# Patient Record
Sex: FEMALE | Race: WHITE | NOT HISPANIC OR LATINO | URBAN - METROPOLITAN AREA
[De-identification: names, ages, dates, MRNs, and addresses within clinical notes are randomized per-mention and may not be internally consistent; named-entity substitution may affect disease eponyms.]

---

## 2022-07-07 ENCOUNTER — TELEPHONE ENCOUNTER (OUTPATIENT)
Dept: URBAN - METROPOLITAN AREA CLINIC 78 | Facility: CLINIC | Age: 57
End: 2022-07-07

## 2022-07-07 ENCOUNTER — OFFICE VISIT (OUTPATIENT)
Dept: URBAN - METROPOLITAN AREA CLINIC 78 | Facility: CLINIC | Age: 57
End: 2022-07-07
Payer: COMMERCIAL

## 2022-07-07 ENCOUNTER — DASHBOARD ENCOUNTERS (OUTPATIENT)
Age: 57
End: 2022-07-07

## 2022-07-07 ENCOUNTER — WEB ENCOUNTER (OUTPATIENT)
Dept: URBAN - METROPOLITAN AREA CLINIC 78 | Facility: CLINIC | Age: 57
End: 2022-07-07

## 2022-07-07 VITALS
HEIGHT: 67 IN | TEMPERATURE: 97.8 F | HEART RATE: 70 BPM | BODY MASS INDEX: 22.57 KG/M2 | DIASTOLIC BLOOD PRESSURE: 68 MMHG | SYSTOLIC BLOOD PRESSURE: 104 MMHG | WEIGHT: 143.8 LBS

## 2022-07-07 DIAGNOSIS — E73.9 LACTOSE INTOLERANCE: ICD-10-CM

## 2022-07-07 DIAGNOSIS — H91.90 HEARING LOSS, UNSPECIFIED HEARING LOSS TYPE, UNSPECIFIED LATERALITY: ICD-10-CM

## 2022-07-07 DIAGNOSIS — K62.5 RECTAL BLEEDING: ICD-10-CM

## 2022-07-07 DIAGNOSIS — D50.8 ACQUIRED IRON DEFICIENCY ANEMIA DUE TO DECREASED ABSORPTION: ICD-10-CM

## 2022-07-07 DIAGNOSIS — K62.89 ANAL PAIN: ICD-10-CM

## 2022-07-07 DIAGNOSIS — K59.01 CONSTIPATION: ICD-10-CM

## 2022-07-07 DIAGNOSIS — R10.13 DYSPEPSIA: ICD-10-CM

## 2022-07-07 PROBLEM — 15188001: Status: ACTIVE | Noted: 2022-07-07

## 2022-07-07 PROBLEM — 14760008 CONSTIPATION: Status: ACTIVE | Noted: 2022-07-07

## 2022-07-07 PROBLEM — 267425008 LACTOSE INTOLERANCE: Status: ACTIVE | Noted: 2022-07-07

## 2022-07-07 PROCEDURE — 46600 DIAGNOSTIC ANOSCOPY SPX: CPT | Performed by: INTERNAL MEDICINE

## 2022-07-07 PROCEDURE — 99244 OFF/OP CNSLTJ NEW/EST MOD 40: CPT | Performed by: INTERNAL MEDICINE

## 2022-07-07 PROCEDURE — 99204 OFFICE O/P NEW MOD 45 MIN: CPT | Performed by: INTERNAL MEDICINE

## 2022-07-07 RX ORDER — THYROID, PORCINE 30 MG/1
TAKE 1 TABLET BY MOUTH EVERY DAY IN THE MORNING TABLET ORAL
Qty: 30 EACH | Refills: 3 | Status: ACTIVE | COMMUNITY

## 2022-07-07 RX ORDER — LORATADINE 10 MG/1
1 TABLET TABLET ORAL ONCE A DAY
Status: ACTIVE | COMMUNITY

## 2022-07-07 RX ORDER — SODIUM, POTASSIUM,MAG SULFATES 17.5-3.13G
354 ML SOLUTION, RECONSTITUTED, ORAL ORAL
Qty: 1 | Refills: 0 | OUTPATIENT
Start: 2022-07-07 | End: 2022-07-08

## 2022-07-07 RX ORDER — MONTELUKAST SODIUM 10 MG/1
TAKE 1 TABLET BY MOUTH EVERY DAY TABLET, FILM COATED ORAL
Qty: 30 EACH | Refills: 1 | Status: ACTIVE | COMMUNITY

## 2022-07-07 RX ORDER — HYDROCORTISONE 25 MG/G
USE TWICE A DAY AS NEEDED FOR RECTAL/HEMORRHOID PAIN CREAM TOPICAL
Qty: 30 GRAM | Refills: 0 | Status: ACTIVE | COMMUNITY

## 2022-07-07 NOTE — PHYSICAL EXAM GASTROINTESTINAL
Abdomen , soft, nontender, nondistended , no guarding or rigidity , no masses palpable , normal bowel sounds , Liver and Spleen , no hepatomegaly present , no hepatosplenomegaly , liver nontender , spleen not palpable , Rectal , normal sphincter tone , grade I internal hemorrhoids, no rectal masses or bleeding present, no external hemorrhoids or perianal fluctuance. No anal fissure noted on anoscopy.

## 2022-07-07 NOTE — HPI-TODAY'S VISIT:
The patient was referred to us by Kristen Presley for evaluation of rectal bleeding and anla pain. A copy of this note will be sent to the referring physician.   She has had issues with anal pain and rectal bleeding only noted in the TP which she presumes is from hemorrhoids. She has soiling often.  She uses fiber, Mg (occasionally) and probiotics. Although she has BM's daily, she has a sense of incomplete evacuation.   She had COVID in Jan/Feb2022 despite being fully vaccinated. She was on 2 different  antibiotics and since then she has had ongoing dyspepsia.  She has multiple digestion issues and has been on a gluten and lactose free diet for years.  The patient denies abdominal pain per se but admits to to a sense of lower abdominal discomfort, constipation, diarrhea, bloating, anorexia or unintentional weight loss. She has gained 20lbs lately.   Regarding any upper GI complaints, the patient has not had heartburn, nausea, vomiting or dysphagia.   She has been rejected from donating blood because of anemia. She has not been on Fe replacement because it worsens her constipation.  She states she has been tested for celiac sprue in the past and although it was negative, she felt dizzy and "a feeling of vertigo" whenever she consumed gluten.  She apparently also went to a nutritionist who performed extensive testing and showed her to have multiple food allergies (tuna, black walnuts, eggs, dairy, etc).   The patient does not take blood thinners. She has an irregular heart beat. She has seen a cardiologist but a conservative approach has been recommended.   They deny any CP or BLAIR.  She is [place].  She has asthma which is well controlled.   The patient has never had a colonoscopy previously. There is no FH of colon cancer or colon polyps. Her father had diverticulosis.  She works in senior living.

## 2022-07-11 ENCOUNTER — OFFICE VISIT (OUTPATIENT)
Dept: URBAN - METROPOLITAN AREA CLINIC 23 | Facility: CLINIC | Age: 57
End: 2022-07-11

## 2022-07-13 PROBLEM — 87522002 IRON DEFICIENCY ANEMIA: Status: ACTIVE | Noted: 2022-07-07

## 2022-08-16 ENCOUNTER — TELEPHONE ENCOUNTER (OUTPATIENT)
Dept: URBAN - METROPOLITAN AREA CLINIC 78 | Facility: CLINIC | Age: 57
End: 2022-08-16

## 2022-08-26 ENCOUNTER — TELEPHONE ENCOUNTER (OUTPATIENT)
Dept: URBAN - METROPOLITAN AREA CLINIC 78 | Facility: CLINIC | Age: 57
End: 2022-08-26

## 2022-08-26 ENCOUNTER — OFFICE VISIT (OUTPATIENT)
Dept: URBAN - METROPOLITAN AREA SURGERY CENTER 15 | Facility: SURGERY CENTER | Age: 57
End: 2022-08-26

## 2022-09-02 ENCOUNTER — CLAIMS CREATED FROM THE CLAIM WINDOW (OUTPATIENT)
Dept: URBAN - METROPOLITAN AREA CLINIC 4 | Facility: CLINIC | Age: 57
End: 2022-09-02
Payer: COMMERCIAL

## 2022-09-02 ENCOUNTER — OFFICE VISIT (OUTPATIENT)
Dept: URBAN - METROPOLITAN AREA SURGERY CENTER 15 | Facility: SURGERY CENTER | Age: 57
End: 2022-09-02
Payer: COMMERCIAL

## 2022-09-02 DIAGNOSIS — K63.89 OTHER SPECIFIED DISEASES OF INTESTINE: ICD-10-CM

## 2022-09-02 DIAGNOSIS — K31.89 ACQUIRED DEFORMITY OF DUODENUM: ICD-10-CM

## 2022-09-02 DIAGNOSIS — K31.7 BENIGN GASTRIC POLYP: ICD-10-CM

## 2022-09-02 DIAGNOSIS — K31.7 POLYP OF STOMACH AND DUODENUM: ICD-10-CM

## 2022-09-02 DIAGNOSIS — K63.89 BACTERIAL OVERGROWTH SYNDROME: ICD-10-CM

## 2022-09-02 DIAGNOSIS — K29.70 GASTRITIS, UNSPECIFIED, WITHOUT BLEEDING: ICD-10-CM

## 2022-09-02 DIAGNOSIS — K31.89 OTHER DISEASES OF STOMACH AND DUODENUM: ICD-10-CM

## 2022-09-02 DIAGNOSIS — K62.5 ANAL BLEEDING: ICD-10-CM

## 2022-09-02 DIAGNOSIS — D50.9 ANEMIA: ICD-10-CM

## 2022-09-02 DIAGNOSIS — K22.89 DILATATION OF ESOPHAGUS: ICD-10-CM

## 2022-09-02 PROCEDURE — 88305 TISSUE EXAM BY PATHOLOGIST: CPT | Performed by: PATHOLOGY

## 2022-09-02 PROCEDURE — 43239 EGD BIOPSY SINGLE/MULTIPLE: CPT | Performed by: INTERNAL MEDICINE

## 2022-09-02 PROCEDURE — 88312 SPECIAL STAINS GROUP 1: CPT | Performed by: PATHOLOGY

## 2022-09-02 PROCEDURE — 45380 COLONOSCOPY AND BIOPSY: CPT | Performed by: INTERNAL MEDICINE

## 2022-09-02 PROCEDURE — G8907 PT DOC NO EVENTS ON DISCHARG: HCPCS | Performed by: INTERNAL MEDICINE

## 2022-09-02 RX ORDER — THYROID, PORCINE 30 MG/1
TAKE 1 TABLET BY MOUTH EVERY DAY IN THE MORNING TABLET ORAL
Qty: 30 EACH | Refills: 3 | Status: ACTIVE | COMMUNITY

## 2022-09-02 RX ORDER — LORATADINE 10 MG/1
1 TABLET TABLET ORAL ONCE A DAY
Status: ACTIVE | COMMUNITY

## 2022-09-02 RX ORDER — MONTELUKAST SODIUM 10 MG/1
TAKE 1 TABLET BY MOUTH EVERY DAY TABLET, FILM COATED ORAL
Qty: 30 EACH | Refills: 1 | Status: ACTIVE | COMMUNITY

## 2022-09-02 RX ORDER — HYDROCORTISONE 25 MG/G
USE TWICE A DAY AS NEEDED FOR RECTAL/HEMORRHOID PAIN CREAM TOPICAL
Qty: 30 GRAM | Refills: 0 | Status: ACTIVE | COMMUNITY

## 2022-11-10 ENCOUNTER — OFFICE VISIT (OUTPATIENT)
Dept: URBAN - METROPOLITAN AREA CLINIC 78 | Facility: CLINIC | Age: 57
End: 2022-11-10